# Patient Record
Sex: MALE | Race: BLACK OR AFRICAN AMERICAN | NOT HISPANIC OR LATINO | Employment: FULL TIME | URBAN - METROPOLITAN AREA
[De-identification: names, ages, dates, MRNs, and addresses within clinical notes are randomized per-mention and may not be internally consistent; named-entity substitution may affect disease eponyms.]

---

## 2018-12-17 PROCEDURE — 96372 THER/PROPH/DIAG INJ SC/IM: CPT

## 2018-12-17 PROCEDURE — 10060 I&D ABSCESS SIMPLE/SINGLE: CPT

## 2018-12-17 PROCEDURE — 99284 EMERGENCY DEPT VISIT MOD MDM: CPT | Mod: 25

## 2018-12-18 ENCOUNTER — HOSPITAL ENCOUNTER (EMERGENCY)
Facility: HOSPITAL | Age: 28
Discharge: HOME | End: 2018-12-18
Attending: EMERGENCY MEDICINE
Payer: COMMERCIAL

## 2018-12-18 VITALS
RESPIRATION RATE: 18 BRPM | BODY MASS INDEX: 23.7 KG/M2 | HEIGHT: 72 IN | SYSTOLIC BLOOD PRESSURE: 141 MMHG | WEIGHT: 175 LBS | DIASTOLIC BLOOD PRESSURE: 76 MMHG | HEART RATE: 92 BPM | OXYGEN SATURATION: 96 % | TEMPERATURE: 98 F

## 2018-12-18 DIAGNOSIS — L02.31 ABSCESS, GLUTEAL, LEFT: Primary | ICD-10-CM

## 2018-12-18 PROCEDURE — 87077 CULTURE AEROBIC IDENTIFY: CPT

## 2018-12-18 PROCEDURE — 10060 I&D ABSCESS SIMPLE/SINGLE: CPT

## 2018-12-18 PROCEDURE — 25000003 PHARM REV CODE 250: Performed by: NURSE PRACTITIONER

## 2018-12-18 PROCEDURE — 87186 SC STD MICRODIL/AGAR DIL: CPT

## 2018-12-18 PROCEDURE — 63600175 PHARM REV CODE 636 W HCPCS: Performed by: NURSE PRACTITIONER

## 2018-12-18 PROCEDURE — 87070 CULTURE OTHR SPECIMN AEROBIC: CPT

## 2018-12-18 RX ORDER — CEPHALEXIN 500 MG/1
500 CAPSULE ORAL 4 TIMES DAILY
Qty: 28 CAPSULE | Refills: 0 | Status: SHIPPED | OUTPATIENT
Start: 2018-12-18 | End: 2018-12-25

## 2018-12-18 RX ORDER — IBUPROFEN 600 MG/1
600 TABLET ORAL EVERY 6 HOURS PRN
Qty: 20 TABLET | Refills: 0 | Status: SHIPPED | OUTPATIENT
Start: 2018-12-18 | End: 2020-06-23

## 2018-12-18 RX ORDER — OXYCODONE AND ACETAMINOPHEN 5; 325 MG/1; MG/1
1 TABLET ORAL
Status: DISCONTINUED | OUTPATIENT
Start: 2018-12-18 | End: 2018-12-18 | Stop reason: HOSPADM

## 2018-12-18 RX ORDER — HYDROCODONE BITARTRATE AND ACETAMINOPHEN 5; 325 MG/1; MG/1
1 TABLET ORAL EVERY 4 HOURS PRN
Qty: 10 TABLET | Refills: 0 | Status: ON HOLD | OUTPATIENT
Start: 2018-12-18 | End: 2020-06-16 | Stop reason: HOSPADM

## 2018-12-18 RX ORDER — LIDOCAINE HYDROCHLORIDE 10 MG/ML
10 INJECTION INFILTRATION; PERINEURAL
Status: COMPLETED | OUTPATIENT
Start: 2018-12-18 | End: 2018-12-18

## 2018-12-18 RX ORDER — IBUPROFEN 600 MG/1
600 TABLET ORAL
Status: COMPLETED | OUTPATIENT
Start: 2018-12-18 | End: 2018-12-18

## 2018-12-18 RX ORDER — MUPIROCIN 20 MG/G
OINTMENT TOPICAL 3 TIMES DAILY
Qty: 15 G | Refills: 0 | Status: SHIPPED | OUTPATIENT
Start: 2018-12-18 | End: 2020-06-23

## 2018-12-18 RX ORDER — CEPHALEXIN 500 MG/1
500 CAPSULE ORAL
Status: COMPLETED | OUTPATIENT
Start: 2018-12-18 | End: 2018-12-18

## 2018-12-18 RX ORDER — HYDROMORPHONE HYDROCHLORIDE 2 MG/ML
1 INJECTION, SOLUTION INTRAMUSCULAR; INTRAVENOUS; SUBCUTANEOUS
Status: COMPLETED | OUTPATIENT
Start: 2018-12-18 | End: 2018-12-18

## 2018-12-18 RX ADMIN — IBUPROFEN 600 MG: 600 TABLET ORAL at 12:12

## 2018-12-18 RX ADMIN — LIDOCAINE HYDROCHLORIDE 10 ML: 10 INJECTION, SOLUTION INFILTRATION; PERINEURAL at 12:12

## 2018-12-18 RX ADMIN — HYDROMORPHONE HYDROCHLORIDE 1 MG: 2 INJECTION, SOLUTION INTRAMUSCULAR; INTRAVENOUS; SUBCUTANEOUS at 12:12

## 2018-12-18 RX ADMIN — CEPHALEXIN 500 MG: 500 CAPSULE ORAL at 12:12

## 2018-12-18 NOTE — ED PROVIDER NOTES
Encounter Date: 12/17/2018    SCRIBE #1 NOTE: I, Nils Ramos, am scribing for, and in the presence of,  Natasha Carpio NP. I have scribed the following portions of the note - Other sections scribed: HPI, ROS.       History     Chief Complaint   Patient presents with    Abscess     pt complains of painful abscess to left buttox. states x 2 days     CC: Abscess    HPI  The patient is a 27 y/o male with pmhx of syphilis that presents for emergent evaluation of an abscess on his L buttock that he noticed x2 days ago. He notes pain when sitting. He states that he has been getting abscesses since he was 13. Attempted treatment of his pain with aleve with mild relief. He otherwise denies fever, chills, NVD, SOB, pain with bowel movement, or chest pain.       The history is provided by the patient. No  was used.     Review of patient's allergies indicates:  No Known Allergies  Past Medical History:   Diagnosis Date    Syphilis      History reviewed. No pertinent surgical history.  Family History   Problem Relation Age of Onset    Hypertension Mother     Sickle cell trait Mother      Social History     Tobacco Use    Smoking status: Current Some Day Smoker     Years: 3.00     Types: Cigars    Smokeless tobacco: Never Used   Substance Use Topics    Alcohol use: Yes     Comment: occasionally    Drug use: No     Review of Systems   Constitutional: Negative for chills and fever.   HENT: Negative for congestion, ear pain, rhinorrhea and sore throat.    Eyes: Negative for pain and redness.   Respiratory: Negative for cough and shortness of breath.    Cardiovascular: Negative for chest pain.   Gastrointestinal: Negative for abdominal pain, diarrhea, nausea and vomiting.   Genitourinary: Negative for dysuria, flank pain, frequency, hematuria and urgency.   Musculoskeletal: Negative for back pain and neck pain.   Skin: Positive for wound (L buttock abscess  ). Negative for rash.   Neurological: Negative  for weakness, light-headedness, numbness and headaches.   All other systems reviewed and are negative.      Physical Exam     Initial Vitals [12/18/18 0000]   BP Pulse Resp Temp SpO2   137/75 96 18 99 °F (37.2 °C) 99 %      MAP       --         Physical Exam    Vitals reviewed.  Constitutional: He appears well-developed and well-nourished. He is not diaphoretic.  Non-toxic appearance. He does not have a sickly appearance. He does not appear ill. No distress.   HENT:   Head: Normocephalic and atraumatic.   Right Ear: External ear normal.   Left Ear: External ear normal.   Neck: Normal range of motion.   Pulmonary/Chest: No respiratory distress.   Abdominal: Soft. There is no tenderness.   Genitourinary:         Musculoskeletal: Normal range of motion.   Neurological: He is alert and oriented to person, place, and time. GCS eye subscore is 4. GCS verbal subscore is 5. GCS motor subscore is 6.   Skin: Skin is warm and dry. Abrasion noted. No rash noted. No erythema.   Psychiatric: He has a normal mood and affect. Thought content normal.         ED Course   I & D - Incision and Drainage  Date/Time: 12/18/2018 6:45 AM  Performed by: Natasha Carpio NP  Authorized by: Pete Sanchez MD   Type: abscess  Body area: anogenital  Location details: gluteal cleft  Anesthesia: local infiltration    Anesthesia:  Local Anesthetic: lidocaine 1% without epinephrine  Anesthetic total: 5 mL  Scalpel size: 10  Incision type: single straight  Complexity: simple  Drainage: pus  Drainage amount: moderate  Wound treatment: incision,  drainage,  deloculation,  expression of material and  wound packed  Packing material: 1/2 in gauze  Specimens: Yes  Patient tolerance: Patient tolerated the procedure well with no immediate complications        Labs Reviewed   CULTURE, AEROBIC  (SPECIFY SOURCE)          Imaging Results    None          Medical Decision Making:   ED Management:  This is an evaluation of a 28 y.o. male that presents to the  Emergency Department for an abscess to left buttock. Physical Exam shows a non-toxic, afebrile, and well appearing male. There is a 2 cm x 2 cm abscess to the left gluteal cleft with a central area of fluctuance with mild surrounding induration and no erythema.  There is not active drainage. No rectal or scrotal involvement.    Vital Signs Are Reassuring. Procedure: Abscess was drained per the procedure note. The wound was packed.      My overall impression is Abscess. I considered, but at this time, do not suspect an perirectal involvement, extensive cellulitis, sepsis, or bacteremia to warrant admission.    D/C Meds:  Keflex, Norco, Motrin. Additional D/C Information: warm compresses 10-15 minutes, 4-5 times a day to help increase wound drainage and decrease swelling. The diagnosis, treatment plan, instructions for follow-up and reevaluation with his PCP or the ED in 2 - 3 days for wound recheck as well as ED return precautions were discussed and understanding was verbalized. All questions or concerns have been addressed.               Scribe Attestation:   Scribe #1: I performed the above scribed service and the documentation accurately describes the services I performed. I attest to the accuracy of the note.    Attending Attestation:           Physician Attestation for Scribe:  Physician Attestation Statement for Scribe #1: I, Natasha Carpio NP, reviewed documentation, as scribed by Nils Ramos in my presence, and it is both accurate and complete.                    Clinical Impression:   The encounter diagnosis was Abscess, gluteal, left.      Disposition:   Disposition: Discharged  Condition: Stable                        Natasha Carpio NP  12/18/18 1208

## 2018-12-18 NOTE — DISCHARGE INSTRUCTIONS
Please make sure to maintain good hygiene, if the abscess is in an area you shave then change your razor and do not shave the area until the wound is healed, do not share towels or other personal items, and use warm compresses 10-15 minutes, 4-5 times a day to help increase wound drainage and decrease swelling, and take your antibiotic medication as prescribed.     Please return to the Emergency Department for any new or worsening problems including: worsening of your abscess, increasing redness or redness extending further up your body, or temperature of greater than 100.4F.    Please follow up with your Primary Care Doctor or Return to the ED in 2-3 days for packing removal and a wound check, or sooner if you wound gets worse. Your packing needs to be removed in 2 - 3 days.

## 2018-12-20 LAB — BACTERIA SPEC AEROBE CULT: NORMAL

## 2020-06-15 ENCOUNTER — HOSPITAL ENCOUNTER (OUTPATIENT)
Facility: HOSPITAL | Age: 30
Discharge: HOME | End: 2020-06-16
Attending: EMERGENCY MEDICINE | Admitting: SURGERY
Payer: COMMERCIAL

## 2020-06-15 DIAGNOSIS — K61.1 PERIRECTAL ABSCESS: Primary | ICD-10-CM

## 2020-06-15 LAB
ALBUMIN SERPL BCP-MCNC: 3.8 G/DL (ref 3.5–5.2)
ALP SERPL-CCNC: 84 U/L (ref 55–135)
ALT SERPL W/O P-5'-P-CCNC: 16 U/L (ref 10–44)
ANION GAP SERPL CALC-SCNC: 11 MMOL/L (ref 8–16)
AST SERPL-CCNC: 21 U/L (ref 10–40)
BACTERIA #/AREA URNS HPF: NORMAL /HPF
BASOPHILS # BLD AUTO: 0.03 K/UL (ref 0–0.2)
BASOPHILS NFR BLD: 0.2 % (ref 0–1.9)
BILIRUB SERPL-MCNC: 0.6 MG/DL (ref 0.1–1)
BILIRUB UR QL STRIP: NEGATIVE
BUN SERPL-MCNC: 12 MG/DL (ref 6–20)
CALCIUM SERPL-MCNC: 9.3 MG/DL (ref 8.7–10.5)
CHLORIDE SERPL-SCNC: 104 MMOL/L (ref 95–110)
CLARITY UR: CLEAR
CO2 SERPL-SCNC: 24 MMOL/L (ref 23–29)
COLOR UR: YELLOW
CREAT SERPL-MCNC: 1 MG/DL (ref 0.5–1.4)
DIFFERENTIAL METHOD: ABNORMAL
EOSINOPHIL # BLD AUTO: 0 K/UL (ref 0–0.5)
EOSINOPHIL NFR BLD: 0.2 % (ref 0–8)
ERYTHROCYTE [DISTWIDTH] IN BLOOD BY AUTOMATED COUNT: 12.3 % (ref 11.5–14.5)
EST. GFR  (AFRICAN AMERICAN): >60 ML/MIN/1.73 M^2
EST. GFR  (NON AFRICAN AMERICAN): >60 ML/MIN/1.73 M^2
GLUCOSE SERPL-MCNC: 93 MG/DL (ref 70–110)
GLUCOSE UR QL STRIP: ABNORMAL
HCT VFR BLD AUTO: 44.9 % (ref 40–54)
HGB BLD-MCNC: 13.9 G/DL (ref 14–18)
HGB UR QL STRIP: NEGATIVE
IMM GRANULOCYTES # BLD AUTO: 0.06 K/UL (ref 0–0.04)
IMM GRANULOCYTES NFR BLD AUTO: 0.5 % (ref 0–0.5)
KETONES UR QL STRIP: NEGATIVE
LEUKOCYTE ESTERASE UR QL STRIP: NEGATIVE
LYMPHOCYTES # BLD AUTO: 1.4 K/UL (ref 1–4.8)
LYMPHOCYTES NFR BLD: 10.6 % (ref 18–48)
MCH RBC QN AUTO: 29.7 PG (ref 27–31)
MCHC RBC AUTO-ENTMCNC: 31 G/DL (ref 32–36)
MCV RBC AUTO: 96 FL (ref 82–98)
MICROSCOPIC COMMENT: NORMAL
MONOCYTES # BLD AUTO: 1.5 K/UL (ref 0.3–1)
MONOCYTES NFR BLD: 11.6 % (ref 4–15)
NEUTROPHILS # BLD AUTO: 10.2 K/UL (ref 1.8–7.7)
NEUTROPHILS NFR BLD: 76.9 % (ref 38–73)
NITRITE UR QL STRIP: NEGATIVE
NRBC BLD-RTO: 0 /100 WBC
PH UR STRIP: 6 [PH] (ref 5–8)
PLATELET # BLD AUTO: 166 K/UL (ref 150–350)
PMV BLD AUTO: 10.8 FL (ref 9.2–12.9)
POTASSIUM SERPL-SCNC: 4.2 MMOL/L (ref 3.5–5.1)
PROT SERPL-MCNC: 7.5 G/DL (ref 6–8.4)
PROT UR QL STRIP: NEGATIVE
RBC # BLD AUTO: 4.68 M/UL (ref 4.6–6.2)
SARS-COV-2 RDRP RESP QL NAA+PROBE: NEGATIVE
SODIUM SERPL-SCNC: 139 MMOL/L (ref 136–145)
SP GR UR STRIP: 1.01 (ref 1–1.03)
URN SPEC COLLECT METH UR: ABNORMAL
UROBILINOGEN UR STRIP-ACNC: ABNORMAL EU/DL
WBC # BLD AUTO: 13.23 K/UL (ref 3.9–12.7)
YEAST URNS QL MICRO: NORMAL

## 2020-06-15 PROCEDURE — 80053 COMPREHEN METABOLIC PANEL: CPT

## 2020-06-15 PROCEDURE — G0378 HOSPITAL OBSERVATION PER HR: HCPCS

## 2020-06-15 PROCEDURE — 25000003 PHARM REV CODE 250: Performed by: EMERGENCY MEDICINE

## 2020-06-15 PROCEDURE — 86592 SYPHILIS TEST NON-TREP QUAL: CPT

## 2020-06-15 PROCEDURE — 96365 THER/PROPH/DIAG IV INF INIT: CPT

## 2020-06-15 PROCEDURE — 96361 HYDRATE IV INFUSION ADD-ON: CPT

## 2020-06-15 PROCEDURE — 96375 TX/PRO/DX INJ NEW DRUG ADDON: CPT

## 2020-06-15 PROCEDURE — 85025 COMPLETE CBC W/AUTO DIFF WBC: CPT

## 2020-06-15 PROCEDURE — U0002 COVID-19 LAB TEST NON-CDC: HCPCS

## 2020-06-15 PROCEDURE — 63600175 PHARM REV CODE 636 W HCPCS: Performed by: EMERGENCY MEDICINE

## 2020-06-15 PROCEDURE — 99285 EMERGENCY DEPT VISIT HI MDM: CPT | Mod: 25

## 2020-06-15 PROCEDURE — 63600175 PHARM REV CODE 636 W HCPCS: Performed by: STUDENT IN AN ORGANIZED HEALTH CARE EDUCATION/TRAINING PROGRAM

## 2020-06-15 PROCEDURE — 81000 URINALYSIS NONAUTO W/SCOPE: CPT

## 2020-06-15 PROCEDURE — 25500020 PHARM REV CODE 255: Performed by: EMERGENCY MEDICINE

## 2020-06-15 RX ORDER — ONDANSETRON 8 MG/1
8 TABLET, ORALLY DISINTEGRATING ORAL EVERY 8 HOURS PRN
Status: DISCONTINUED | OUTPATIENT
Start: 2020-06-15 | End: 2020-06-16 | Stop reason: HOSPADM

## 2020-06-15 RX ORDER — ONDANSETRON 2 MG/ML
4 INJECTION INTRAMUSCULAR; INTRAVENOUS
Status: COMPLETED | OUTPATIENT
Start: 2020-06-15 | End: 2020-06-15

## 2020-06-15 RX ORDER — LIDOCAINE HYDROCHLORIDE 10 MG/ML
1 INJECTION, SOLUTION EPIDURAL; INFILTRATION; INTRACAUDAL; PERINEURAL ONCE
Status: DISCONTINUED | OUTPATIENT
Start: 2020-06-15 | End: 2020-06-16 | Stop reason: HOSPADM

## 2020-06-15 RX ORDER — TALC
6 POWDER (GRAM) TOPICAL NIGHTLY PRN
Status: DISCONTINUED | OUTPATIENT
Start: 2020-06-15 | End: 2020-06-16 | Stop reason: HOSPADM

## 2020-06-15 RX ORDER — SODIUM CHLORIDE 0.9 % (FLUSH) 0.9 %
10 SYRINGE (ML) INJECTION
Status: DISCONTINUED | OUTPATIENT
Start: 2020-06-15 | End: 2020-06-16 | Stop reason: HOSPADM

## 2020-06-15 RX ORDER — HYDROMORPHONE HYDROCHLORIDE 2 MG/ML
1 INJECTION, SOLUTION INTRAMUSCULAR; INTRAVENOUS; SUBCUTANEOUS
Status: COMPLETED | OUTPATIENT
Start: 2020-06-15 | End: 2020-06-15

## 2020-06-15 RX ORDER — SODIUM CHLORIDE, SODIUM LACTATE, POTASSIUM CHLORIDE, CALCIUM CHLORIDE 600; 310; 30; 20 MG/100ML; MG/100ML; MG/100ML; MG/100ML
INJECTION, SOLUTION INTRAVENOUS CONTINUOUS
Status: DISCONTINUED | OUTPATIENT
Start: 2020-06-15 | End: 2020-06-16

## 2020-06-15 RX ORDER — LIDOCAINE HYDROCHLORIDE AND EPINEPHRINE 10; 10 MG/ML; UG/ML
1 INJECTION, SOLUTION INFILTRATION; PERINEURAL
Status: ACTIVE | OUTPATIENT
Start: 2020-06-15 | End: 2020-06-16

## 2020-06-15 RX ORDER — HYDROCODONE BITARTRATE AND ACETAMINOPHEN 5; 325 MG/1; MG/1
1 TABLET ORAL EVERY 4 HOURS PRN
Status: DISCONTINUED | OUTPATIENT
Start: 2020-06-15 | End: 2020-06-16 | Stop reason: HOSPADM

## 2020-06-15 RX ORDER — ACETAMINOPHEN 325 MG/1
650 TABLET ORAL EVERY 8 HOURS PRN
Status: DISCONTINUED | OUTPATIENT
Start: 2020-06-15 | End: 2020-06-16 | Stop reason: HOSPADM

## 2020-06-15 RX ADMIN — ONDANSETRON 4 MG: 2 INJECTION INTRAMUSCULAR; INTRAVENOUS at 07:06

## 2020-06-15 RX ADMIN — HYDROMORPHONE HYDROCHLORIDE 1 MG: 2 INJECTION, SOLUTION INTRAMUSCULAR; INTRAVENOUS; SUBCUTANEOUS at 07:06

## 2020-06-15 RX ADMIN — SODIUM CHLORIDE, SODIUM LACTATE, POTASSIUM CHLORIDE, AND CALCIUM CHLORIDE: .6; .31; .03; .02 INJECTION, SOLUTION INTRAVENOUS at 10:06

## 2020-06-15 RX ADMIN — IOHEXOL 75 ML: 350 INJECTION, SOLUTION INTRAVENOUS at 06:06

## 2020-06-15 RX ADMIN — PIPERACILLIN AND TAZOBACTAM 4.5 G: 4; .5 INJECTION, POWDER, LYOPHILIZED, FOR SOLUTION INTRAVENOUS; PARENTERAL at 05:06

## 2020-06-15 NOTE — PROVIDER PROGRESS NOTES - EMERGENCY DEPT.
Emergency Department TeleTRIAGE Encounter Note      CHIEF COMPLAINT    Chief Complaint   Patient presents with    Rectal Pain     Boil to the r/side of the buttock that need to be drained. Pt knows it a boil secodary to HX of it.       VITAL SIGNS   Initial Vitals [06/15/20 1411]   BP Pulse Resp Temp SpO2   121/71 97 18 98.8 °F (37.1 °C) 98 %      MAP       --            ALLERGIES    Review of patient's allergies indicates:  No Known Allergies    PROVIDER TRIAGE NOTE  Patient with past medical history syphilis presents to the ED for evaluation of abscess.  Patient states he has had an abscess to the right buttock for the past 3 days.  He states the pain has been worsening since onset.  He denies drainage.  He has had several similar episodes in the left buttock.  He denies fever or pain with bowel movements.      ORDERS  Labs Reviewed - No data to display    ED Orders (720h ago, onward)    None            Virtual Visit Note: The provider triage portion of this emergency department evaluation and documentation was performed via [facility], a HIPAA-compliant telemedicine application, in concert with a tele-presenter in the room. A face to face patient evaluation with one of my colleagues will occur once the patient is placed in an emergency department room.      DISCLAIMER: This note was prepared with [employer] voice recognition transcription software. Garbled syntax, mangled pronouns, and other bizarre constructions may be attributed to that software system.

## 2020-06-15 NOTE — ED TRIAGE NOTES
Pt presents to ED with CC of 5/10 to right buttock starting Friday, probably a boil/cyst. Pt reports having had these before.     Denies any other symptoms at this time. Pt unable to sit or lie flat.     AAOx4, able to verbalize and follow commands, ambulate independently.

## 2020-06-15 NOTE — ED PROVIDER NOTES
EM PHYSICIAN NOTE    HPI  This patient presents with a complaint of a lump to the right buttocks.  Chief Complaint   Patient presents with    Rectal Pain     Boil to the r/side of the buttock that need to be drained. Pt knows it a boil secodary to HX of it.       HPI: This is a 30 y.o. M who presents to the ED complaining of lump to the right buttocks x's 3 days. Pt reports frequent recurrence of lump to both sides of the buttocks. He states that the lump is consistent with abscess. He states that the lump never occurs elsewhere on the body. His last tetanus shot was in 2014. He admits to anal sex 2 weeks ago without pain. Pt denies fever.    REVIEW of PMH, SOC History and Family History:  Past Medical History:   Diagnosis Date    Syphilis      History reviewed. No pertinent surgical history.  Social History     Socioeconomic History    Marital status: Single     Spouse name: Not on file    Number of children: Not on file    Years of education: Not on file    Highest education level: Not on file   Occupational History    Not on file   Social Needs    Financial resource strain: Not on file    Food insecurity     Worry: Not on file     Inability: Not on file    Transportation needs     Medical: Not on file     Non-medical: Not on file   Tobacco Use    Smoking status: Never Smoker    Smokeless tobacco: Never Used   Substance and Sexual Activity    Alcohol use: Not Currently     Comment: occasionally    Drug use: No    Sexual activity: Not on file   Lifestyle    Physical activity     Days per week: Not on file     Minutes per session: Not on file    Stress: Not on file   Relationships    Social connections     Talks on phone: Not on file     Gets together: Not on file     Attends Judaism service: Not on file     Active member of club or organization: Not on file     Attends meetings of clubs or organizations: Not on file     Relationship status: Not on file   Other Topics Concern    Not on file    Social History Narrative    Not on file     There is no problem list on file for this patient.    Current Facility-Administered Medications   Medication Dose Route Frequency Provider Last Rate Last Dose    hydromorphone (PF) injection 1 mg  1 mg Intravenous ED 1 Time Pavel Brenner MD        lidocaine-EPINEPHrine 1%-1:100,000 injection 1 mL  1 mL Intradermal ED 1 Time Pavel Brenner MD        ondansetron injection 4 mg  4 mg Intravenous ED 1 Time Pavel Brenner MD         Current Outpatient Medications   Medication Sig Dispense Refill    acyclovir (ZOVIRAX) 400 MG tablet Take 1 tablet (400 mg total) by mouth 3 (three) times daily. 30 tablet 0    HYDROcodone-acetaminophen (NORCO) 5-325 mg per tablet Take 1 tablet by mouth every 4 (four) hours as needed for Pain. 10 tablet 0    ibuprofen (ADVIL,MOTRIN) 600 MG tablet Take 1 tablet (600 mg total) by mouth every 6 (six) hours as needed for Pain. 20 tablet 0    mupirocin (BACTROBAN) 2 % ointment Apply topically 3 (three) times daily. 15 g 0     Review of patient's allergies indicates:  No Known Allergies    Immunization History   Administered Date(s) Administered    Td - PF (ADULT) 12/17/2014       Family History   Problem Relation Age of Onset    Hypertension Mother     Sickle cell trait Mother        REVIEW of SYSTEMS  Source: See HPI.  The nurse's notes and triage vital signs were reviewed.  GENERAL/CONSTITUTIONAL: There is no report of fever, fatigue, weakness, or unexplained weight loss.  CARDIOVASCULAR: There is no report of chest pain   RESPIRATORY: There is no report of cough or SOB  GASTROINTESTINAL: There is no report of nausea, vomiting, diarrhea  MUSCULOSKELETAL: There is no report of joint or muscle pain. No muscle weakness or tenderness.  SKIN AND BREASTS: Lump to the right buttock. There is no report of easy bruising, skin redness, skin rash.  HEMATOLOGIC/LYMPHATIC: There is no report of anemia, bleeding or clotting defects. There  "is no report of anticoagulant use.  The remainder of the ROS is negative.    PHYSICAL EXAMINATION    ED Triage Vitals [06/15/20 1411]   Enc Vitals Group      /71      Pulse 97      Resp 18      Temp 98.8 °F (37.1 °C)      Temp src Oral      SpO2 98 %      Weight 165 lb      Height 6' 1"      Head Circumference       Peak Flow       Pain Score       Pain Loc       Pain Edu?       Excl. in GC?      Vital signs and Pulse Ox reviewed in clinical context. Abnormalities noted: none:  Heart rate, blood pressure, temperature, respiratory rate and pulse ox are all within normal limits for age  Pt's level of consciousness is alert, and the patient is in no distress.  Skin: warm, pink and dry  Mucosa:moist  Head and Neck: WNL  Cardiac exam: RRR  Pulmonary exam: unlabored and clear  Abd Exam: soft  Musculoskeletal: no joint tenderness, deformity or swelling   Neurologic: GCS 15. 5 over 5 strength, cranial nerves intact, neck supple  Skin: Small ulceration on the buttock on the right side that is draining minimal purulence. No fluctuance but there is nontender induration  Genitourinary: Large amount of perirectal tenderness on the right. Right sided pain on the rectal exam.  No appreciable mass or fluctuance but the exam is limited due to the patient's pain     Medical decision making: Nursing notes reviewed and incorporated  Orders Placed This Encounter   Procedures    C. trachomatis/N. gonorrhoeae by AMP DNA [facility]; Urine    CT Abdomen Pelvis With Contrast    Comprehensive metabolic panel    CBC auto differential    Urinalysis    RPR    Urinalysis Microscopic    Provide suture tray to patient bedside    Inpatient consult to General surgery    Saline lock IV       Impression:  Perirectal infection rule out abscess  Plan:  Labs, antibiotics, CT  Micelle DAVON Brenner MD, 4:22 PM 6/15/2020          ED Course as of Luis 15 1957   Mon Luis 15, 2020   1740 CBC:  Leukocytosis noted    []   1839 CMP normal    []   1924 " CT reviewed: There is a right perianal rim enhancing fluid collection measuring 4.2 x 2.1 cm concerning for abscess.  There is associated induration of the surrounding soft tissues.    []   1925 Surg consult at bedside.    []      ED Course User Index  [] Pavel Brenner MD       Diagnoses that have been ruled out:   None   Diagnoses that are still under consideration:   None   Final diagnoses:   Perirectal abscess            Follow-up Information    None       ED Prescriptions     None          This note was created using Dictation Software.  This program may occasionally misinterpret certain words and phrases.      I attest that I personally performed the services documented by the scribe and acknowledged and confirm the content of the note.   Nurses notes were reviewed.  Pavel Brenner MD  06/15/20 1923       Pavel Brenner MD  06/15/20 1924       Pavel Brenner MD  06/15/20 1957

## 2020-06-16 ENCOUNTER — ANESTHESIA (OUTPATIENT)
Dept: SURGERY | Facility: HOSPITAL | Age: 30
End: 2020-06-16
Payer: COMMERCIAL

## 2020-06-16 ENCOUNTER — ANESTHESIA EVENT (OUTPATIENT)
Dept: SURGERY | Facility: HOSPITAL | Age: 30
End: 2020-06-16
Payer: COMMERCIAL

## 2020-06-16 VITALS
HEIGHT: 73 IN | DIASTOLIC BLOOD PRESSURE: 61 MMHG | OXYGEN SATURATION: 98 % | TEMPERATURE: 99 F | HEART RATE: 83 BPM | WEIGHT: 165.38 LBS | RESPIRATION RATE: 16 BRPM | BODY MASS INDEX: 21.92 KG/M2 | SYSTOLIC BLOOD PRESSURE: 120 MMHG

## 2020-06-16 LAB
ANION GAP SERPL CALC-SCNC: 8 MMOL/L (ref 8–16)
BASOPHILS # BLD AUTO: 0.02 K/UL (ref 0–0.2)
BASOPHILS NFR BLD: 0.2 % (ref 0–1.9)
BUN SERPL-MCNC: 10 MG/DL (ref 6–20)
CALCIUM SERPL-MCNC: 8.8 MG/DL (ref 8.7–10.5)
CHLORIDE SERPL-SCNC: 103 MMOL/L (ref 95–110)
CO2 SERPL-SCNC: 29 MMOL/L (ref 23–29)
CREAT SERPL-MCNC: 1 MG/DL (ref 0.5–1.4)
DIFFERENTIAL METHOD: ABNORMAL
EOSINOPHIL # BLD AUTO: 0.1 K/UL (ref 0–0.5)
EOSINOPHIL NFR BLD: 0.8 % (ref 0–8)
ERYTHROCYTE [DISTWIDTH] IN BLOOD BY AUTOMATED COUNT: 12.1 % (ref 11.5–14.5)
EST. GFR  (AFRICAN AMERICAN): >60 ML/MIN/1.73 M^2
EST. GFR  (NON AFRICAN AMERICAN): >60 ML/MIN/1.73 M^2
GLUCOSE SERPL-MCNC: 101 MG/DL (ref 70–110)
HCT VFR BLD AUTO: 39.3 % (ref 40–54)
HGB BLD-MCNC: 12.4 G/DL (ref 14–18)
IMM GRANULOCYTES # BLD AUTO: 0.03 K/UL (ref 0–0.04)
IMM GRANULOCYTES NFR BLD AUTO: 0.3 % (ref 0–0.5)
LYMPHOCYTES # BLD AUTO: 1.2 K/UL (ref 1–4.8)
LYMPHOCYTES NFR BLD: 11.2 % (ref 18–48)
MCH RBC QN AUTO: 29.8 PG (ref 27–31)
MCHC RBC AUTO-ENTMCNC: 31.6 G/DL (ref 32–36)
MCV RBC AUTO: 95 FL (ref 82–98)
MONOCYTES # BLD AUTO: 1.3 K/UL (ref 0.3–1)
MONOCYTES NFR BLD: 11.9 % (ref 4–15)
NEUTROPHILS # BLD AUTO: 8.2 K/UL (ref 1.8–7.7)
NEUTROPHILS NFR BLD: 75.6 % (ref 38–73)
NRBC BLD-RTO: 0 /100 WBC
PLATELET # BLD AUTO: 186 K/UL (ref 150–350)
PMV BLD AUTO: 10.7 FL (ref 9.2–12.9)
POTASSIUM SERPL-SCNC: 4 MMOL/L (ref 3.5–5.1)
RBC # BLD AUTO: 4.16 M/UL (ref 4.6–6.2)
SODIUM SERPL-SCNC: 140 MMOL/L (ref 136–145)
WBC # BLD AUTO: 10.82 K/UL (ref 3.9–12.7)

## 2020-06-16 PROCEDURE — 36000704 HC OR TIME LEV I 1ST 15 MIN: Performed by: SURGERY

## 2020-06-16 PROCEDURE — 36000705 HC OR TIME LEV I EA ADD 15 MIN: Performed by: SURGERY

## 2020-06-16 PROCEDURE — 96376 TX/PRO/DX INJ SAME DRUG ADON: CPT | Mod: 59

## 2020-06-16 PROCEDURE — D9220A PRA ANESTHESIA: ICD-10-PCS | Mod: ANES,,, | Performed by: ANESTHESIOLOGY

## 2020-06-16 PROCEDURE — 25000003 PHARM REV CODE 250: Performed by: STUDENT IN AN ORGANIZED HEALTH CARE EDUCATION/TRAINING PROGRAM

## 2020-06-16 PROCEDURE — 93005 ELECTROCARDIOGRAM TRACING: CPT | Mod: 59

## 2020-06-16 PROCEDURE — 87077 CULTURE AEROBIC IDENTIFY: CPT

## 2020-06-16 PROCEDURE — 87491 CHLMYD TRACH DNA AMP PROBE: CPT

## 2020-06-16 PROCEDURE — 63600175 PHARM REV CODE 636 W HCPCS: Performed by: STUDENT IN AN ORGANIZED HEALTH CARE EDUCATION/TRAINING PROGRAM

## 2020-06-16 PROCEDURE — 63600175 PHARM REV CODE 636 W HCPCS: Performed by: NURSE ANESTHETIST, CERTIFIED REGISTERED

## 2020-06-16 PROCEDURE — D9220A PRA ANESTHESIA: ICD-10-PCS | Mod: CRNA,,, | Performed by: NURSE ANESTHETIST, CERTIFIED REGISTERED

## 2020-06-16 PROCEDURE — 46050 I&D PERIANAL ABSCESS SUPFC: CPT | Mod: ,,, | Performed by: SURGERY

## 2020-06-16 PROCEDURE — 87185 SC STD ENZYME DETCJ PER NZM: CPT

## 2020-06-16 PROCEDURE — 46050 PR I&D PERIANAL ABSCESS,SUPERFICIAL: ICD-10-PCS | Mod: ,,, | Performed by: SURGERY

## 2020-06-16 PROCEDURE — D9220A PRA ANESTHESIA: Mod: ANES,,, | Performed by: ANESTHESIOLOGY

## 2020-06-16 PROCEDURE — 80048 BASIC METABOLIC PNL TOTAL CA: CPT

## 2020-06-16 PROCEDURE — 94760 N-INVAS EAR/PLS OXIMETRY 1: CPT

## 2020-06-16 PROCEDURE — 87075 CULTR BACTERIA EXCEPT BLOOD: CPT

## 2020-06-16 PROCEDURE — 85025 COMPLETE CBC W/AUTO DIFF WBC: CPT

## 2020-06-16 PROCEDURE — G0378 HOSPITAL OBSERVATION PER HR: HCPCS

## 2020-06-16 PROCEDURE — 36415 COLL VENOUS BLD VENIPUNCTURE: CPT

## 2020-06-16 PROCEDURE — 37000008 HC ANESTHESIA 1ST 15 MINUTES: Performed by: SURGERY

## 2020-06-16 PROCEDURE — 37000009 HC ANESTHESIA EA ADD 15 MINS: Performed by: SURGERY

## 2020-06-16 PROCEDURE — 71000033 HC RECOVERY, INTIAL HOUR: Performed by: SURGERY

## 2020-06-16 PROCEDURE — 87070 CULTURE OTHR SPECIMN AEROBIC: CPT

## 2020-06-16 PROCEDURE — 96361 HYDRATE IV INFUSION ADD-ON: CPT

## 2020-06-16 PROCEDURE — 93010 EKG 12-LEAD: ICD-10-PCS | Mod: ,,, | Performed by: INTERNAL MEDICINE

## 2020-06-16 PROCEDURE — 93010 ELECTROCARDIOGRAM REPORT: CPT | Mod: ,,, | Performed by: INTERNAL MEDICINE

## 2020-06-16 PROCEDURE — D9220A PRA ANESTHESIA: Mod: CRNA,,, | Performed by: NURSE ANESTHETIST, CERTIFIED REGISTERED

## 2020-06-16 PROCEDURE — 63600175 PHARM REV CODE 636 W HCPCS: Performed by: ANESTHESIOLOGY

## 2020-06-16 RX ORDER — FENTANYL CITRATE 50 UG/ML
25 INJECTION, SOLUTION INTRAMUSCULAR; INTRAVENOUS EVERY 5 MIN PRN
Status: DISCONTINUED | OUTPATIENT
Start: 2020-06-16 | End: 2020-06-16 | Stop reason: HOSPADM

## 2020-06-16 RX ORDER — HYDROMORPHONE HYDROCHLORIDE 2 MG/ML
0.2 INJECTION, SOLUTION INTRAMUSCULAR; INTRAVENOUS; SUBCUTANEOUS EVERY 5 MIN PRN
Status: DISCONTINUED | OUTPATIENT
Start: 2020-06-16 | End: 2020-06-16 | Stop reason: HOSPADM

## 2020-06-16 RX ORDER — ACETAMINOPHEN 10 MG/ML
1000 INJECTION, SOLUTION INTRAVENOUS ONCE
Status: COMPLETED | OUTPATIENT
Start: 2020-06-16 | End: 2020-06-16

## 2020-06-16 RX ORDER — LIDOCAINE HYDROCHLORIDE 20 MG/ML
INJECTION INTRAVENOUS
Status: DISCONTINUED | OUTPATIENT
Start: 2020-06-16 | End: 2020-06-16

## 2020-06-16 RX ORDER — HYDROCODONE BITARTRATE AND ACETAMINOPHEN 5; 325 MG/1; MG/1
1 TABLET ORAL EVERY 4 HOURS PRN
Qty: 28 TABLET | Refills: 0 | Status: SHIPPED | OUTPATIENT
Start: 2020-06-16 | End: 2020-06-21

## 2020-06-16 RX ORDER — DEXAMETHASONE SODIUM PHOSPHATE 4 MG/ML
INJECTION, SOLUTION INTRA-ARTICULAR; INTRALESIONAL; INTRAMUSCULAR; INTRAVENOUS; SOFT TISSUE
Status: DISCONTINUED | OUTPATIENT
Start: 2020-06-16 | End: 2020-06-16

## 2020-06-16 RX ORDER — ONDANSETRON 2 MG/ML
INJECTION INTRAMUSCULAR; INTRAVENOUS
Status: DISCONTINUED | OUTPATIENT
Start: 2020-06-16 | End: 2020-06-16

## 2020-06-16 RX ORDER — SUCCINYLCHOLINE CHLORIDE 20 MG/ML
INJECTION INTRAMUSCULAR; INTRAVENOUS
Status: DISCONTINUED | OUTPATIENT
Start: 2020-06-16 | End: 2020-06-16

## 2020-06-16 RX ORDER — FENTANYL CITRATE 50 UG/ML
INJECTION, SOLUTION INTRAMUSCULAR; INTRAVENOUS
Status: DISCONTINUED | OUTPATIENT
Start: 2020-06-16 | End: 2020-06-16

## 2020-06-16 RX ORDER — SODIUM CHLORIDE 0.9 % (FLUSH) 0.9 %
3 SYRINGE (ML) INJECTION
Status: DISCONTINUED | OUTPATIENT
Start: 2020-06-16 | End: 2020-06-16 | Stop reason: HOSPADM

## 2020-06-16 RX ORDER — MIDAZOLAM HYDROCHLORIDE 1 MG/ML
INJECTION, SOLUTION INTRAMUSCULAR; INTRAVENOUS
Status: DISCONTINUED | OUTPATIENT
Start: 2020-06-16 | End: 2020-06-16

## 2020-06-16 RX ORDER — PROPOFOL 10 MG/ML
VIAL (ML) INTRAVENOUS
Status: DISCONTINUED | OUTPATIENT
Start: 2020-06-16 | End: 2020-06-16

## 2020-06-16 RX ORDER — PHENYLEPHRINE HYDROCHLORIDE 10 MG/ML
INJECTION INTRAVENOUS
Status: DISCONTINUED | OUTPATIENT
Start: 2020-06-16 | End: 2020-06-16

## 2020-06-16 RX ADMIN — HYDROCODONE BITARTRATE AND ACETAMINOPHEN 1 TABLET: 5; 325 TABLET ORAL at 02:06

## 2020-06-16 RX ADMIN — FENTANYL CITRATE 100 MCG: 50 INJECTION INTRAMUSCULAR; INTRAVENOUS at 11:06

## 2020-06-16 RX ADMIN — ONDANSETRON 4 MG: 2 INJECTION, SOLUTION INTRAMUSCULAR; INTRAVENOUS at 11:06

## 2020-06-16 RX ADMIN — Medication 100 MG: at 11:06

## 2020-06-16 RX ADMIN — SUCCINYLCHOLINE CHLORIDE 100 MG: 20 INJECTION, SOLUTION INTRAMUSCULAR; INTRAVENOUS at 11:06

## 2020-06-16 RX ADMIN — SODIUM CHLORIDE, SODIUM LACTATE, POTASSIUM CHLORIDE, AND CALCIUM CHLORIDE: .6; .31; .03; .02 INJECTION, SOLUTION INTRAVENOUS at 11:06

## 2020-06-16 RX ADMIN — DEXAMETHASONE SODIUM PHOSPHATE 4 MG: 4 INJECTION, SOLUTION INTRAMUSCULAR; INTRAVENOUS at 11:06

## 2020-06-16 RX ADMIN — ACETAMINOPHEN 1000 MG: 10 INJECTION, SOLUTION INTRAVENOUS at 12:06

## 2020-06-16 RX ADMIN — PIPERACILLIN SODIUM AND TAZOBACTAM SODIUM 4.5 G: 4; .5 INJECTION, POWDER, LYOPHILIZED, FOR SOLUTION INTRAVENOUS at 02:06

## 2020-06-16 RX ADMIN — MIDAZOLAM HYDROCHLORIDE 2 MG: 1 INJECTION, SOLUTION INTRAMUSCULAR; INTRAVENOUS at 11:06

## 2020-06-16 RX ADMIN — PROPOFOL 200 MG: 10 INJECTION, EMULSION INTRAVENOUS at 11:06

## 2020-06-16 RX ADMIN — PHENYLEPHRINE HYDROCHLORIDE 100 MCG: 10 INJECTION INTRAVENOUS at 11:06

## 2020-06-16 RX ADMIN — PIPERACILLIN SODIUM AND TAZOBACTAM SODIUM 4.5 G: 4; .5 INJECTION, POWDER, LYOPHILIZED, FOR SOLUTION INTRAVENOUS at 04:06

## 2020-06-16 NOTE — ANESTHESIA POSTPROCEDURE EVALUATION
Anesthesia Post Evaluation    Patient: Ben Zimmer    Procedure(s) Performed: Procedure(s) (LRB):  Exam under anesthesia, drainage of garcia-rectal abscess  (N/A)    Final Anesthesia Type: general    Patient location during evaluation: PACU  Patient participation: Yes- Able to Participate  Level of consciousness: awake and alert  Post-procedure vital signs: reviewed and stable  Pain management: adequate  Airway patency: patent    PONV status at discharge: No PONV  Anesthetic complications: no      Cardiovascular status: hemodynamically stable  Respiratory status: unassisted and spontaneous ventilation  Hydration status: euvolemic  Follow-up not needed.          Vitals Value Taken Time   /77 06/16/20 1300   Temp 37.1 °C (98.8 °F) 06/16/20 1215   Pulse 78 06/16/20 1259   Resp 13 06/16/20 1259   SpO2 97 % 06/16/20 1259   Vitals shown include unvalidated device data.      Event Time   Out of Recovery 06/16/2020 13:11:06         Pain/Faviola Score: Pain Rating Prior to Med Admin: 0 (6/16/2020 12:37 PM)  Pain Rating Post Med Admin: 3 (6/16/2020  3:19 AM)  Faviola Score: 10 (6/16/2020 12:45 PM)

## 2020-06-16 NOTE — PLAN OF CARE
Problem: Adult Inpatient Plan of Care  Goal: Plan of Care Review  Outcome: Ongoing, Progressing  Goal: Patient-Specific Goal (Individualization)  Outcome: Ongoing, Progressing  Goal: Absence of Hospital-Acquired Illness or Injury  Outcome: Ongoing, Progressing  Goal: Optimal Comfort and Wellbeing  Outcome: Ongoing, Progressing  Goal: Readiness for Transition of Care  Outcome: Ongoing, Progressing  Goal: Rounds/Family Conference  Outcome: Ongoing, Progressing     Pt VS remained stable and rested comfortably at bedside. Pt had c/o 7/10 sharp pain to right buttock that was treated w/ [place] and pain decreased to 3/10. Pt updated on plan of care and free of falls this shift

## 2020-06-16 NOTE — OP NOTE
[facility]  Surgery Department  Operative Note    SUMMARY     Date of Procedure: 6/16/2020     Procedure: Procedure(s) (LRB):  Exam under anesthesia, drainage of garcia-rectal abscess  (N/A)     Surgeon(s) and Role:     * Karel Posadas MD - Primary    Assisting Surgeon: Lindy Rizvi PGY5    Pre-Operative Diagnosis: Perirectal abscess [K61.1]    Post-Operative Diagnosis: Post-Op Diagnosis Codes:     * Perirectal abscess [K61.1]    Anesthesia: General    Technical Procedures Used: Appropriate informed consent for the procedure was obtained from the patient.  The patient was taken to the OR where he underwent general anesthesia and was placed in lithotomy position.  The area of the perineum was prepped and draped in a sterile fashion with betadine prep.  A standardized time out was performed.  The procedure was begun with a rectal exam which revealed fullness along the right anteriormedial wall of the anus.  A retractor was passed into the anus and did not reveal obvious opening of a fistula en ano at this time.  Exam also noted a small pit at the left posterior aspect of the ischiorectal fossa likely consistent with patient's prior history of incision and drainage, but no obvious tract was identified when this was probed with a lacrimal probe, and did not communicate with another abscess cavity.  Attention was turned to the right ischorectal fossa; an 18 gauge needle was passed into the area of fluctuance and aspirated purulent discharge which was sent for culture.  A 15 blade was used to make an eliptical incision over this and the abscess cavity was entered and probed, noted to track superiorly towards the ischiorectal fossa.  A lacrimal probe was placed into this abscess cavity but no obvious fistula opening was found internally at this time as well.  The wound was thoroughly irrigated with saline and packed with plain packing strip and covered with dry gauze. All counts were correct at the end  of the procedure.  The patient tolerated the procedure well without complications and was returned to the PACU in stable condition.  Dr. Posadas was present and scrubbed for all critical portions of the procedure including examination of the anus and probing for fistula opening which did not reveal a tract from the right sided abscess cavity or the healed left pit scar, as well as incision and drainage of the right sided abscess pocket.     Description of the Findings of the Procedure: right perirectal abscess, drained and packed - no obvious tract of fistula when probed; pt also with old pit on left ischorectal area from site of previous incision/drainage, no tract on this identified as well and no current abscess pocket     Complications: No    Estimated Blood Loss (EBL): minimal           Implants: none    Specimens: cultures from abscess   Specimen (12h ago, onward)    None                  Condition: Good    Disposition: PACU - hemodynamically stable.

## 2020-06-16 NOTE — PROGRESS NOTES
"   [facility]      General Surgery Progress Note    06/16/2020  7:52 AM      Patient: Ben Zimmer  MRN: [id number]  Admit Date: 6/15/2020  LOS: 0    Subjective: No acute events overnight, NPO for surgery. No fevers.       Patient Active Problem List   Diagnosis    Perirectal abscess       No current facility-administered medications on file prior to encounter.      Current Outpatient Medications on File Prior to Encounter   Medication Sig Dispense Refill    acyclovir (ZOVIRAX) 400 MG tablet Take 1 tablet (400 mg total) by mouth 3 (three) times daily. 30 tablet 0    HYDROcodone-acetaminophen (NORCO) 5-325 mg per tablet Take 1 tablet by mouth every 4 (four) hours as needed for Pain. 10 tablet 0    ibuprofen (ADVIL,MOTRIN) 600 MG tablet Take 1 tablet (600 mg total) by mouth every 6 (six) hours as needed for Pain. 20 tablet 0    mupirocin (BACTROBAN) 2 % ointment Apply topically 3 (three) times daily. 15 g 0       Objective                                                                                                          Vitals:    06/15/20 2159 06/16/20 0006 06/16/20 0313 06/16/20 0419   BP: 127/74 122/70 (!) 123/58    BP Location: Left arm Left arm Left arm    Patient Position: Lying Lying Lying    Pulse: 88 84 70    Resp: 18 20 18    Temp: 99.3 °F (37.4 °C) 98.1 °F (36.7 °C) 98.7 °F (37.1 °C)    TempSrc: Oral Oral Oral    SpO2: 97% 99% 96%    Weight: 75 kg (165 lb 5.5 oz)   75 kg (165 lb 5.5 oz)   Height: 6' 1" (1.854 m)          CBC   Recent Labs   Lab 06/15/20  1644 06/16/20  0500   WBC 13.23* 10.82   HGB 13.9* 12.4*   HCT 44.9 39.3*    186       CHEM   Recent Labs   Lab 06/15/20  1644 06/16/20  0500    140   K 4.2 4.0    103   CO2 24 29   BUN 12 10   CREATININE 1.0 1.0   GLU 93 101       PHYSICAL EXAM:    GEN: awake, alert, no acute distress  CARD: RRR no mrg  LUNGS: equal movement, no distress  ABD; soft, NTND    Assessment / Plan:                                "                                                            A/P: 30 y.o. male admitted with perirectal abscess; no fevers or chills.  WBC improved on IV abx but still has pain.    Will plan for OR drainage today, consents signed yesterday.     Lindy Rizvi MD  [facility] Surgery PGY-V

## 2020-06-16 NOTE — DISCHARGE SUMMARY
[facility]  General Surgery  Discharge Summary      Patient Name: Ben Zimmer  MRN: [id number]  Admission Date: 6/15/2020  Hospital Length of Stay: 0 days  Discharge Date and Time:  06/16/2020 3:20 PM  Attending Physician: Karel Posadas MD   Discharging Provider: Lindy Rizvi MD  Primary Care Provider: Livan Andersen MD     HPI: 29 yo M with hx multiple perirectal abscesses presented with complaint of worsening anal pain for the past few days and concern for perianal/perirectal abscess on CT.    Procedure(s) (LRB):  Exam under anesthesia, drainage of garcia-rectal abscess  (N/A)     Hospital Course: The patient was admitted from the ER with IV abx on 6/15 and taken to the OR for I&D and exam under anesthesia on 6/16/20.  He tolerated the procedure well and the abscess appeared thoroughly drained without signs of cellulitis at the time of the procedure. The patient was returned to the PACU then floor for further recovery.  By the afternoon of POD#0, he was feeling better with pain improved, and will be discharged home in stable condition. He is to pull his packing tomorrow and follow up with Dr. Posadas in the next week.  We will not discharge the patient on antibiotics since he does not have additional risk factors and no signs of cellulitis, but should he develop worsening pain, fevers or discharge, we will ask the patient to call the clinic and make an earlier appointment or to discuss culture- based antibiotics.     Consults:   Consults (From admission, onward)        Status Ordering Provider     Inpatient consult to General surgery  Once     Provider:  Juaquin Witt MD    Acknowledged LINDY RIZVI          Pending Diagnostic Studies:     Procedure Component Value Units Date/Time    EKG 12-lead [[id number]]     Order Status: Sent Lab Status: No result     RPR [[id number]] Collected: 06/15/20 1745    Order Status: Sent Lab Status: In process Updated: 06/16/20 0837    Specimen: Blood          Final Active Diagnoses:    Diagnosis Date Noted POA    PRINCIPAL PROBLEM:  Perirectal abscess [K61.1] 06/15/2020 Yes      Problems Resolved During this Admission:      Discharged Condition: good    Disposition: Home or Self Care    Follow Up:  Follow-up Information     Karel Posadas MD In 1 week.    Specialties: General Surgery, Surgery  Contact information:  [address] [place] [address]  [phone]             Schedule an appointment as soon as possible for a visit to follow up.    Why: For wound re-check, notify clinic if increased drainage or pain prior to discuss need for abx (no abx needed at this time)               Patient Instructions:      Diet Adult Regular     Lifting restrictions   Order Comments: No lifting over 10 lb x 2 weeks, please remove packing tomorrow AM or when having BM. OK to shower once packing removed, also OK to cover with dry gauze     Notify your health care provider if you experience any of the following:  temperature >100.4     Notify your health care provider if you experience any of the following:  persistent nausea and vomiting or diarrhea     Notify your health care provider if you experience any of the following:  severe uncontrolled pain     Notify your health care provider if you experience any of the following:  redness, tenderness, or signs of infection (pain, swelling, redness, odor or green/yellow discharge around incision site)     Remove dressing in 24 hours   Order Comments: Ok to shower and cover with gauze as needed     Activity as tolerated     Medications:  Reconciled Home Medications:      Medication List      CHANGE how you take these medications    HYDROcodone-acetaminophen 5-325 mg per tablet  Commonly known as: NORCO  Take 1 tablet by mouth every 4 (four) hours as needed.  What changed: reasons to take this        CONTINUE taking these medications    acyclovir 400 MG tablet  Commonly known as: ZOVIRAX  Take 1 tablet (400 mg total) by mouth 3 (three)  times daily.     ibuprofen 600 MG tablet  Commonly known as: ADVIL,MOTRIN  Take 1 tablet (600 mg total) by mouth every 6 (six) hours as needed for Pain.     mupirocin 2 % ointment  Commonly known as: BACTROBAN  Apply topically 3 (three) times daily.            Lindy Rizvi MD  General Surgery  [facility]

## 2020-06-16 NOTE — TRANSFER OF CARE
"Anesthesia Transfer of Care Note    Patient: Ben Zimmer    Procedure(s) Performed: Procedure(s) (LRB):  Exam under anesthesia, drainage of garcia-rectal abscess  (N/A)    Patient location: PACU    Anesthesia Type: general    Transport from OR: Transported from OR on room air with adequate spontaneous ventilation    Post pain: adequate analgesia    Post assessment: no apparent anesthetic complications and tolerated procedure well    Post vital signs: stable    Level of consciousness: awake, alert and oriented    Nausea/Vomiting: no nausea/vomiting    Complications: none    Transfer of care protocol was followed      Last vitals:   Visit Vitals  /81 (BP Location: Left arm, Patient Position: Lying)   Pulse 96   Temp 37.1 °C (98.8 °F) (Oral)   Resp 18   Ht 6' 1" (1.854 m)   Wt 75 kg (165 lb 5.5 oz)   SpO2 98%   BMI 21.81 kg/m²     "

## 2020-06-16 NOTE — PLAN OF CARE
06/16/20 1625   Final Note   Assessment Type Final Discharge Note   Anticipated Discharge Disposition Home   Hospital Follow Up  Appt(s) scheduled? Yes   Discharge plans and expectations educations in teach back method with documentation complete? Yes   Right Care Referral Info   Post Acute Recommendation No Care   Post-Acute Status   Post-Acute Authorization Other   Other Status No Post-Acute Service Needs   Discharge Delays None known at this time   EDUCATION:  Things You are responsible For To Manage Your Care At Home:  1.    Getting your prescriptions filled   2.    Taking your medications as directed, DO NOT MISS ANY DOSES!  3.    Going to your follow-up doctor appointment. This is important because it  allow the doctor to monitor your progress and determine if  any changes need to made to your treatment plan.  Call [facility] Help at home number for new or repeated problems / symptoms   Call 911 for CP and / or SOB    NurseSharon notified that all CM needs are met

## 2020-06-16 NOTE — PROGRESS NOTES
Pharmacist Renal Dose Adjustment Note    Ben Zimmer is a 30 y.o. male being treated with the medication Zosyn 3.375 g q8h    Patient Data:    Vital Signs (Most Recent):  Temp: 98.8 °F (37.1 °C) (06/15/20 1411)  Pulse: 92 (06/15/20 1947)  Resp: 16 (06/15/20 1947)  BP: 138/87 (06/15/20 1947)  SpO2: 100 % (06/15/20 1947) Vital Signs (72h Range):  Temp:  [98.8 °F (37.1 °C)]   Pulse:  [86-97]   Resp:  [16-18]   BP: (111-138)/(62-87)   SpO2:  [98 %-100 %]      Recent Labs   Lab 06/15/20  1644   CREATININE 1.0     Serum creatinine: 1 mg/dL 06/15/20 1644  Estimated creatinine clearance: 114.3 mL/min    Medication: Zosyn 3.375 g q8h will be changed to Zosyn 4.5 g q8h     Pharmacist's Name: Danica Andersen  Pharmacist's Extension: [phone]

## 2020-06-16 NOTE — PROGRESS NOTES
[facility] CASE MANAGEMENT                  WRITTEN DISCHARGE INFORMATION      APPOINTMENTS AND RESOURCES TO HELP YOU MANAGE YOUR CARE AT HOME BASED ON YOUR PREFERENCES:  (If an appointment is not scheduled for you when you leave the hospital, call your doctor to schedule a follow up visit within a week)    Follow-up Information     Karel Posadas MD On 6/23/2020.    Specialties: General Surgery, Surgery  Why: @1:45pm, for Surgery follow up  Contact information:  [address] [place] [address]  [phone]                   Healthy Living Instructions to HELP MANAGE YOUR CARE AT HOME:  Things You are responsible for:  1.    Getting your prescriptions filled   2.    Taking your medications as directed, DO NOT MISS ANY DOSES!  3.    Following the diet and exercise recommended by your doctor  4.    Going to your follow-up doctor appointment. This is important because it allows the doctor to monitor your progress and determine if any changes need to made to your treatment plan.  5. If you have any questions about MANAGING YOUR CARE AT HOME Call the Nurse Care Line for 24/7 Assistance [phone]       Please answer any calls you may receive from [facility]. We want to continue to support you as you manage your healthcare needs. [facility] is happy to have the opportunity to serve you.      Thank you for choosing [facility] for your healthcare needs!  Your [facility] Case Management Team,

## 2020-06-16 NOTE — H&P
[facility]  General Surgery  History & Physical    Patient Name: Ben Zimmer  MRN: [id number]  Admission Date: 6/15/2020  Attending Physician: Karel Posadas  Primary Care Provider: Livan Andersen MD    Patient information was obtained from patient and ER records.     Subjective:     Chief Complaint/Reason for Admission: rectal pain    History of Present Illness:  Patient is a 30 y.o. healthy male with a history of recurrent perianal/gluteal abscesses who presents with an approx 3 day history of worsening right-sided garcia-rectal pain. He usually gets abscesses on the left side (most recent 2 weeks ago). He has had no systemic symptoms. No personal or family history of IBD. No history of diarrhea, constipation, abdominal pain, or bloody stools. No respiratory or urinary symptoms.     Patient does not smoke. Surgical history only positive for previous I&D. He thinks some of these might have been done in the OR, but thinks he hasn't had general anesthesia since he was very young.     No current facility-administered medications on file prior to encounter.      Current Outpatient Medications on File Prior to Encounter   Medication Sig    acyclovir (ZOVIRAX) 400 MG tablet Take 1 tablet (400 mg total) by mouth 3 (three) times daily.    HYDROcodone-acetaminophen (NORCO) 5-325 mg per tablet Take 1 tablet by mouth every 4 (four) hours as needed for Pain.    ibuprofen (ADVIL,MOTRIN) 600 MG tablet Take 1 tablet (600 mg total) by mouth every 6 (six) hours as needed for Pain.    mupirocin (BACTROBAN) 2 % ointment Apply topically 3 (three) times daily.       Review of patient's allergies indicates:  No Known Allergies    Past Medical History:   Diagnosis Date    Syphilis      Past Surgical History  Multiple I&D    Family History     Problem Relation (Age of Onset)    Hypertension Mother    Sickle cell trait Mother        Tobacco Use    Smoking status: Never Smoker    Smokeless tobacco: Never Used    Substance and Sexual Activity    Alcohol use: Not Currently     Comment: occasionally    Drug use: No    Sexual activity: Not on file     Review of Systems  Objective:     Vital Signs (Most Recent):  Temp: 99.3 °F (37.4 °C) (06/15/20 2159)  Pulse: 88 (06/15/20 2159)  Resp: 18 (06/15/20 2159)  BP: 127/74 (06/15/20 2159)  SpO2: 97 % (06/15/20 2159) Vital Signs (24h Range):  Temp:  [98.5 °F (36.9 °C)-99.3 °F (37.4 °C)] 99.3 °F (37.4 °C)  Pulse:  [86-97] 88  Resp:  [16-18] 18  SpO2:  [96 %-100 %] 97 %  BP: (111-138)/(62-87) 127/74     Weight: 75 kg (165 lb 5.5 oz)  Body mass index is 21.81 kg/m².    Physical Exam  Constitutional:       Appearance: Normal appearance. He is normal weight.   HENT:      Head: Normocephalic and atraumatic.      Mouth/Throat:      Mouth: Mucous membranes are moist.   Eyes:      General: No scleral icterus.     Extraocular Movements: Extraocular movements intact.      Pupils: Pupils are equal, round, and reactive to light.   Neck:      Musculoskeletal: Normal range of motion and neck supple.   Cardiovascular:      Rate and Rhythm: Normal rate and regular rhythm.      Heart sounds: Normal heart sounds.   Pulmonary:      Effort: Pulmonary effort is normal.      Breath sounds: Normal breath sounds.   Abdominal:      General: Abdomen is flat. There is no distension.      Palpations: Abdomen is soft.      Tenderness: There is no abdominal tenderness.   Genitourinary:     Comments: Left buttock with induration and mild edema, one small pit draining reddish/cloudy fluid, not malodorous  On JORGE patient is tender with an area of induration/fluctuance in the right anterior rectum approx 3 cm from the anal verge. No purulence, normal brown stool in rectal vault  Musculoskeletal: Normal range of motion.         General: No swelling or tenderness.   Skin:     General: Skin is warm and dry.      Findings: No erythema or rash.   Neurological:      General: No focal deficit present.      Mental Status:  He is alert and oriented to person, place, and time.   Psychiatric:         Mood and Affect: Mood normal.         Behavior: Behavior normal.         Thought Content: Thought content normal.         Judgment: Judgment normal.         Significant Labs:  CBC:   Recent Labs   Lab 06/15/20  1644   WBC 13.23*   RBC 4.68   HGB 13.9*   HCT 44.9      MCV 96   MCH 29.7   MCHC 31.0*     CMP:   Recent Labs   Lab 06/15/20  1644   GLU 93   CALCIUM 9.3   ALBUMIN 3.8   PROT 7.5      K 4.2   CO2 24      BUN 12   CREATININE 1.0   ALKPHOS 84   ALT 16   AST 21   BILITOT 0.6     Microbiology Results (last 7 days)     Procedure Component Value Units Date/Time    C. trachomatis/N. gonorrhoeae by AMP DNA [facility]; Urine [[id number]] Collected: 06/15/20 1850    Order Status: Sent Specimen: Genital Updated: 06/15/20 1858        Recent Labs   Lab 06/15/20  1850   COLORU Yellow   SPECGRAV 1.015   PHUR 6.0   PROTEINUA Negative   BACTERIA Rare   NITRITE Negative   LEUKOCYTESUR Negative   UROBILINOGEN 4.0-6.0*       Significant Diagnostics:  Images of the lower thorax are remarkable for a pulmonary nodule within the right lower lobe measuring 4 mm.     The liver is mildly hypoattenuating, could reflect steatosis, correlation with LFTs recommended.  There is a subcentimeter focus of low attenuation within the medial aspect of the right hepatic lobe, too small for characterization.  The spleen, pancreas, gallbladder and adrenal glands are unremarkable.  There is no biliary dilation or ascites.  No gastric wall thickening.  The portal vein, splenic vein, SMV, celiac axis and SMA all are patent.  No significant abdominal lymphadenopathy.     The kidneys enhance symmetrically without hydronephrosis or nephrolithiasis.  The bilateral ureters are unable to be followed in their entirety to the urinary bladder, no definite calculi seen and no secondary findings to suggest obstructive uropathy.  The urinary bladder is nondistended noting  there is some residual wall thickening.  The prostate is not enlarged     There are a few scattered colonic diverticula, no convincing surrounding inflammation to suggest diverticulitis.  There is moderate to large amount of stool within the colon.  The terminal ileum and appendix are unremarkable.  The small bowel is unremarkable.  No focal organized pelvic fluid collection.     There is a sclerotic focus within the right femoral head, suggesting bone island.  No acute osseous abnormality.  There is left inguinal lymphadenopathy.  There is a right perianal rim enhancing fluid collection measuring 4.2 x 2.1 cm concerning for abscess.  There is associated induration of the surrounding soft tissues.    Assessment/Plan:     This is a 31 yo healthy male who presents with a right perianal abscess. He has a history of similar abscesses. No diabetes or IBD. He is stable with mild leukocytosis. Very tender on JORGE. An area of fluctuance is appreciated approx 3 cm from the anal verge.    Given the location of the abscess and how tender patient is on exam, we will admit to observation and perform I&D in OR tomorrow AM.    R/B/A explained to patient who gives written consent  Regular diet, NPO at midnight/IVF  Zosyn  Feliciano Green    Patient discussed with attending surgeon Dr. Katharina Awan MD  General Surgery  [facility]

## 2020-06-16 NOTE — PLAN OF CARE
06/16/20 1622   Discharge Assessment   Assessment Type Discharge Planning Assessment   Confirmed/corrected address and phone number on facesheet? Yes   Assessment information obtained from? Patient   Expected Length of Stay (days)   (discharged)   Communicated expected length of stay with patient/caregiver yes   Prior to hospitilization cognitive status: Alert/Oriented   Prior to hospitalization functional status: Independent   Current cognitive status: Alert/Oriented   Current Functional Status: Independent   Lives With parent(s)   Able to Return to Prior Arrangements yes   Is patient able to care for self after discharge? Yes   Patient's perception of discharge disposition home or selfcare   Readmission Within the Last 30 Days no previous admission in last 30 days   Patient currently being followed by outpatient case management? No   Patient currently receives any other outside agency services? No   Equipment Currently Used at Home none   Do you have any problems affording any of your prescribed medications? No   Is the patient taking medications as prescribed? yes   Does the patient have transportation home? Yes   Transportation Anticipated car, drives self   Does the patient receive services at the Coumadin Clinic? No   Discharge Plan A Home   DME Needed Upon Discharge  none   Patient/Family in Agreement with Plan yes

## 2020-06-16 NOTE — NURSING
Pt arrived on unit from the ED dept via stretcher accompanied per transport. Pt AAOx4, RR even and unlabored, PERRL with no c/o pain. Saline lock in place to site is clear. Pt  Admission assessment in progress, pt informed of md orders, oriented to environment and safety maintained with bed low side rails up x2 with nurse call bell within reach

## 2020-06-16 NOTE — PLAN OF CARE
Faviola 10/10. AAOx4. VSS per flow sheet.  Denies pain. Gauze dressing in place with mesh panty.  No n/v present at this time; tolerating ice chips.  See chart for full assessment. Hand off report to KANE Herrera OBS.

## 2020-06-16 NOTE — ANESTHESIA PREPROCEDURE EVALUATION
06/16/2020  Ben Zimmer is a 30 y.o., male.    Anesthesia Evaluation     I have reviewed the Nursing Notes.       Review of Systems  Anesthesia Hx:  No problems with previous Anesthesia   Social:  Non-Smoker    Cardiovascular:  Cardiovascular Normal Exercise tolerance: good     Pulmonary:  Pulmonary Normal    Renal/:  Renal/ Normal     Hepatic/GI:  Hepatic/GI Normal    Neurological:  Neurology Normal    Endocrine:  Endocrine Normal        Physical Exam  General:  Well nourished    Airway/Jaw/Neck:  AIRWAY FINDINGS: Normal      Chest/Lungs:  Chest/Lungs Clear    Heart/Vascular:  Heart Findings: Normal       Mental Status:  Mental Status Findings: Normal        Anesthesia Plan  Type of Anesthesia, risks & benefits discussed:  Anesthesia Type:  general  Patient's Preference:   Intra-op Monitoring Plan: standard ASA monitors  Intra-op Monitoring Plan Comments:   Post Op Pain Control Plan:   Post Op Pain Control Plan Comments:   Induction:   IV  Beta Blocker:  Patient is not currently on a Beta-Blocker (No further documentation required).       Informed Consent: Patient understands risks and agrees with Anesthesia plan.  Questions answered. Anesthesia consent signed with patient.  ASA Score: 1     Day of Surgery Review of History & Physical:  There are no significant changes.  H&P update referred to the provider.         Ready For Surgery From Anesthesia Perspective.

## 2020-06-16 NOTE — PLAN OF CARE
06/16/20 1010   Discharge Assessment   Assessment Type Discharge Planning Assessment   To patient's room to discuss patient managing his care at home.    Patient out of room.    TN name and contact info placed on the communication board along with disposition, approximate date of DC, next steps and emergency contact

## 2020-06-17 LAB
BACTERIA SPEC AEROBE CULT: ABNORMAL
C TRACH DNA SPEC QL NAA+PROBE: NOT DETECTED
N GONORRHOEA DNA SPEC QL NAA+PROBE: NOT DETECTED
RPR SER QL: NORMAL

## 2020-06-19 LAB — BACTERIA SPEC ANAEROBE CULT: NORMAL

## 2020-06-23 ENCOUNTER — OFFICE VISIT (OUTPATIENT)
Dept: SURGERY | Facility: CLINIC | Age: 30
End: 2020-06-23
Payer: COMMERCIAL

## 2020-06-23 VITALS
BODY MASS INDEX: 21.87 KG/M2 | HEART RATE: 102 BPM | SYSTOLIC BLOOD PRESSURE: 124 MMHG | WEIGHT: 165 LBS | DIASTOLIC BLOOD PRESSURE: 86 MMHG | HEIGHT: 73 IN

## 2020-06-23 DIAGNOSIS — K61.0 PERIANAL ABSCESS: Primary | ICD-10-CM

## 2020-06-23 PROCEDURE — 99999 PR PBB SHADOW E&M-EST. PATIENT-LVL III: CPT | Mod: PBBFAC,,, | Performed by: SURGERY

## 2020-06-23 PROCEDURE — 99999 PR PBB SHADOW E&M-EST. PATIENT-LVL III: ICD-10-PCS | Mod: PBBFAC,,, | Performed by: SURGERY

## 2020-06-23 PROCEDURE — 99024 PR POST-OP FOLLOW-UP VISIT: ICD-10-PCS | Mod: S$GLB,,, | Performed by: SURGERY

## 2020-06-23 PROCEDURE — 99024 POSTOP FOLLOW-UP VISIT: CPT | Mod: S$GLB,,, | Performed by: SURGERY

## 2020-06-23 NOTE — PROGRESS NOTES
Surgery Clinic Note    Ben Zimmer is a 30 y.o. year old male in clinic today for follow up of perianal I&D 1 wk ago. Pain controlled. No more drainage. No issues with BM. Wound healing up  No f/c/n/v/sob/cp    ROS:  Negative except above    PE:  Vitals:    06/23/20 1355   BP: 124/86   Pulse: 102     NAD  Ambulates well  No belabored breathing  Right side perianal: 1x2 cm ellipse, pink granulating tissue. No expressible fluid. No cellulitis. Non tender    A/P:  Ben Zimmer is a 30 y.o. year old male s/p right Perianal abscess drainage    -keep wound clean  -rtc prn    Karel Posadas  General Surgery - [facility]  6/23/2020  2:06 PM

## 2020-06-29 ENCOUNTER — NURSE TRIAGE (OUTPATIENT)
Dept: ADMINISTRATIVE | Facility: CLINIC | Age: 30
End: 2020-06-29

## 2020-06-30 NOTE — TELEPHONE ENCOUNTER
Contacted pt on behalf of Post Procedural Symptom Tracker. Pt verified by first and last name and . Pt denies any fever, cough, or difficulty breathing since procedure. Advised pt if these symptoms do arise to contact [facility] or PCP. No follow up needed.    Reason for Disposition   Health Information question, no triage required and triager able to answer question    Protocols used: INFORMATION ONLY CALL - NO TRIAGE-A-

## 2021-04-15 ENCOUNTER — PATIENT MESSAGE (OUTPATIENT)
Dept: RESEARCH | Facility: HOSPITAL | Age: 31
End: 2021-04-15

## (undated) DEVICE — ELECTRODE REM PLYHSV RETURN 9

## (undated) DEVICE — CHLORAPREP W TINT 26ML APPL

## (undated) DEVICE — COVER OVERHEAD SURG LT BLUE

## (undated) DEVICE — JELLY LUBRICANT STERILE 4 OZ

## (undated) DEVICE — GLOVE BIOGEL 7.5

## (undated) DEVICE — STRIP PACKING ANTIMIC 1/4X1

## (undated) DEVICE — GLOVE BIOGEL PI MICRO SZ 7

## (undated) DEVICE — SYR DISP LL 5CC

## (undated) DEVICE — SWAB CULTURETTE II DUAL

## (undated) DEVICE — SLEEVE SCD EXPRESS CALF MEDIUM

## (undated) DEVICE — GAUZE SPONGE 4X4 12PLY

## (undated) DEVICE — BLANKET UPPER BODY 78.7X29.9IN

## (undated) DEVICE — Device

## (undated) DEVICE — COLLECTOR SPECIMEN ANAEROBIC

## (undated) DEVICE — SEE MEDLINE ITEM 157181

## (undated) DEVICE — NDL 18GA X1 1/2 REG BEVEL

## (undated) DEVICE — SEE L#120831

## (undated) DEVICE — GLOVE SURGICAL LATEX SZ 6

## (undated) DEVICE — PACK ENDOSCOPY GENERAL

## (undated) DEVICE — UNDERGLOVES BIOGEL PI SZ 7 LF